# Patient Record
Sex: FEMALE | Race: OTHER | NOT HISPANIC OR LATINO | URBAN - METROPOLITAN AREA
[De-identification: names, ages, dates, MRNs, and addresses within clinical notes are randomized per-mention and may not be internally consistent; named-entity substitution may affect disease eponyms.]

---

## 2022-01-03 ENCOUNTER — EMERGENCY (EMERGENCY)
Facility: HOSPITAL | Age: 27
LOS: 1 days | Discharge: HOME | End: 2022-01-03
Attending: EMERGENCY MEDICINE | Admitting: EMERGENCY MEDICINE
Payer: MEDICAID

## 2022-01-03 VITALS
HEART RATE: 60 BPM | HEIGHT: 67 IN | SYSTOLIC BLOOD PRESSURE: 110 MMHG | TEMPERATURE: 98 F | DIASTOLIC BLOOD PRESSURE: 68 MMHG | RESPIRATION RATE: 18 BRPM | WEIGHT: 108.91 LBS | OXYGEN SATURATION: 98 %

## 2022-01-03 DIAGNOSIS — Z88.0 ALLERGY STATUS TO PENICILLIN: ICD-10-CM

## 2022-01-03 DIAGNOSIS — J02.9 ACUTE PHARYNGITIS, UNSPECIFIED: ICD-10-CM

## 2022-01-03 DIAGNOSIS — U07.1 COVID-19: ICD-10-CM

## 2022-01-03 LAB
HCG UR QL: NEGATIVE — SIGNIFICANT CHANGE UP
S PYO AG SPEC QL IA: NEGATIVE — SIGNIFICANT CHANGE UP
SARS-COV-2 RNA SPEC QL NAA+PROBE: DETECTED

## 2022-01-03 PROCEDURE — 99284 EMERGENCY DEPT VISIT MOD MDM: CPT

## 2022-01-03 NOTE — ED ADULT NURSE NOTE - NSIMPLEMENTINTERV_GEN_ALL_ED
Implemented All Universal Safety Interventions:  [place] to call system. Call bell, personal items and telephone within reach. Instruct patient to call for assistance. Room bathroom lighting operational. Non-slip footwear when patient is off stretcher. Physically safe environment: no spills, clutter or unnecessary equipment. Stretcher in lowest position, wheels locked, appropriate side rails in place.

## 2022-01-03 NOTE — ED ADULT TRIAGE NOTE - CHIEF COMPLAINT QUOTE
Pt complaining of sore throat. PT states "I think I have strep I can see white patches on my tonsils."

## 2022-01-03 NOTE — ED PROVIDER NOTE - ENMT, MLM
Airway patent, NCAT. Mild erythema to bilateral tonsils with enlargement. +White patches on bilateral tonsils. No lymphadenopathy.

## 2022-01-03 NOTE — ED PROVIDER NOTE - CLINICAL SUMMARY MEDICAL DECISION MAKING FREE TEXT BOX
Patient presenting with sore throat x 2 days. Will obtain rapid strep and COVID swab. Patient did not want to wait for results but will f/u to obtain results of test. Stable upon discharge.

## 2022-01-03 NOTE — ED PROVIDER NOTE - OBJECTIVE STATEMENT
25 y/o female presenting with 2 days of sore throat with white patches on her tonsils. Denies fever or chills. Unknown exposure to COVID. Has a history of strep in the past and is allergic to Amoxicillin and Penicillin.

## 2022-03-17 ENCOUNTER — EMERGENCY (EMERGENCY)
Facility: HOSPITAL | Age: 27
LOS: 1 days | Discharge: HOME | End: 2022-03-17
Admitting: EMERGENCY MEDICINE
Payer: MEDICAID

## 2022-03-17 VITALS
SYSTOLIC BLOOD PRESSURE: 126 MMHG | RESPIRATION RATE: 18 BRPM | TEMPERATURE: 98 F | HEART RATE: 74 BPM | HEIGHT: 67 IN | OXYGEN SATURATION: 98 % | DIASTOLIC BLOOD PRESSURE: 89 MMHG

## 2022-03-17 PROBLEM — J02.0 STREPTOCOCCAL PHARYNGITIS: Chronic | Status: ACTIVE | Noted: 2022-01-05

## 2022-03-17 LAB — SARS-COV-2 RNA SPEC QL NAA+PROBE: SIGNIFICANT CHANGE UP

## 2022-03-17 PROCEDURE — 99284 EMERGENCY DEPT VISIT MOD MDM: CPT

## 2022-03-17 NOTE — ED PROVIDER NOTE - PHYSICAL EXAMINATION
Constitutional: Well appearing, awake, alert, oriented to person, place, time/situation and in no apparent distress.  HEENT: Airway patent, NCAT, normal posterior oropharynx without erythema, swelling, or exudates  Resp: no conversational dyspnea, tachypnea, or signs of respiratory distress  Msk: ambulating with normal steady gait  Neuro: A&Ox3

## 2022-03-17 NOTE — ED PROVIDER NOTE - NSFOLLOWUPINSTRUCTIONS_ED_ALL_ED_FT
TRY TAKING ZYRTEC/CETIRIZINE OVER THE COUNTER ALLERGY MEDICATIONS.     IF THOSE DO NOT WORK, YOU CAN TRY SUDAFED OR SUDAFED PE.     YOUR COVID RESULT SHOULD BE TEXTED TO YOU WITHIN 24 HOURS, USUALLY BY TONIGHT.     FOLLOW UP WITH YOUR PMD.       Postnasal Drip    WHAT YOU NEED TO KNOW:    What is postnasal drip? Postnasal drip is a condition that causes a large amount of mucus to collect in your throat or nose. It may also be called upper airway cough syndrome because the mucus causes repeated coughing. You may have a sore throat, or throat tissues may swell. This may feel like a lump in your throat. You may also feel like you need to clear your throat often.    What causes postnasal drip?   •A cold or the flu      •Allergies, such as hay fever or a milk allergy      •Cold air, or dry air in a heated area      •Pregnancy or hormone changes      •Medical conditions such as a deviated septum, gastroesophageal reflux (GERD), or problems with structures in your throat      •Certain medicines, such as birth control pills and blood pressure medicines      •An infection in your sinuses or nose      How is postnasal drip diagnosed and treated? Your healthcare provider will examine you and ask about your symptoms. Tell your provider if you have symptoms all the time or if they come and go. Include anything that triggers your symptoms, such as cold air or pollen. A sample of the mucus may be tested for bacteria that could be causing your symptoms.  •Medicines may be given to thin the mucus. You may need to swallow the medicine or use a device to flush your sinuses with liquid squirted into your nose. Nasal sprays may also be needed to keep the tissues in your nose moist. Medicines can also relieve congestion. Allergy medicine may help if your symptoms are caused by seasonal allergies, such as hay fever. You may need medicine to help control GERD.      •Antibiotics may be needed to treat a bacterial infection.      What can I do to manage postnasal drip?   •Use a humidifier or vaporizer. Use a cool mist humidifier or a vaporizer to increase air moisture in your home. This may make it easier for you to breathe.       •Drink more liquids as directed. Liquids help keep your air passages moist and help you cough up mucus. Ask how much liquid to drink each day and which liquids are best for you.       •Avoid cold air and dry, heated air. Cold or dry air can trigger postnasal drip. Try to stay inside on cold days, or keep your mouth covered. Do not stay long in areas that have dry, heated air.      •Do not smoke, and avoid secondhand smoke. Nicotine and other chemicals in cigarettes and cigars can irritate your throat and make coughing worse. Ask your healthcare provider for information if you currently smoke and need help to quit. E-cigarettes or smokeless tobacco still contain nicotine. Talk to your healthcare provider before you use these products.       When should I contact my healthcare provider?   •You have trouble breathing because of the mucus.      •You have new or worsening symptoms, even with treatment.      •You have signs of an infection, such as yellow or green mucus, or a fever.      •You have questions or concerns about your condition or care.      CARE AGREEMENT:    You have the right to help plan your care. Learn about your health condition and how it may be treated. Discuss treatment options with your healthcare providers to decide what care you want to receive. You always have the right to refuse treatment.

## 2022-03-17 NOTE — ED PROVIDER NOTE - PATIENT PORTAL LINK FT
You can access the FollowMyHealth Patient Portal offered by [facility] by registering at the following website: [email or web address]. By joining [employer]’s FollowMyHealth portal, you will also be able to view your health information using other applications (apps) compatible with our system.

## 2022-03-17 NOTE — ED PROVIDER NOTE - OBJECTIVE STATEMENT
25yo F presents with c/o postnasal drip for 3-4 days. states it feels like there is a bubble in her throat and it is affecting her sleeping. describes some cough also. denies fever, chills, nausea, vomiting, sore throat, swelling in the throat or lips, ear pain, any other concerns. had negative COVID 19 PCR 2 days ago and is fully vaccinated for COVID. attempted tx with tea, salt water gargles, mucinex without relief.

## 2022-03-21 DIAGNOSIS — R09.81 NASAL CONGESTION: ICD-10-CM

## 2022-03-21 DIAGNOSIS — R09.82 POSTNASAL DRIP: ICD-10-CM

## 2022-03-21 DIAGNOSIS — Z88.0 ALLERGY STATUS TO PENICILLIN: ICD-10-CM

## 2023-08-17 NOTE — ED PROVIDER NOTE - PATIENT PORTAL LINK FT
You can access the FollowMyHealth Patient Portal offered by [facility] by registering at the following website: [email or web address]. By joining [employer]’s FollowMyHealth portal, you will also be able to view your health information using other applications (apps) compatible with our system. Estimated Blood Loss (Cc): minimal

## 2024-11-19 NOTE — ED ADULT TRIAGE NOTE - HEIGHT IN INCHES
Name: EMILY MCCABE    Relationship: Mother    Best Callback Number:     [phone]       HUB PROVIDED THE RELAY MESSAGE FROM THE OFFICE   PATIENT     [phone]       ADDITIONAL INFORMATION: PATIENT WOULD LIKE TO TALK WITH DR ONOFRE'S NURSE.  HE HAS AN APPOINTMENT ON THE SCHEDULE FOR THURSDAY, BUT SHE SAID THAT SHE DID NOT MAKE THAT NOR TALK WITH DR ONOFRE ABOUT THIS.     7

## 2025-02-24 ENCOUNTER — EMERGENCY (EMERGENCY)
Facility: HOSPITAL | Age: 30
LOS: 1 days | Discharge: HOME | End: 2025-02-24
Attending: EMERGENCY MEDICINE | Admitting: STUDENT IN AN ORGANIZED HEALTH CARE EDUCATION/TRAINING PROGRAM
Payer: COMMERCIAL

## 2025-02-24 ENCOUNTER — EMERGENCY (EMERGENCY)
Facility: HOSPITAL | Age: 30
LOS: 1 days | Discharge: ANOTHER HOSPITAL | End: 2025-02-24
Attending: EMERGENCY MEDICINE | Admitting: EMERGENCY MEDICINE
Payer: COMMERCIAL

## 2025-02-24 VITALS
DIASTOLIC BLOOD PRESSURE: 60 MMHG | SYSTOLIC BLOOD PRESSURE: 98 MMHG | TEMPERATURE: 98 F | HEART RATE: 71 BPM | RESPIRATION RATE: 16 BRPM | OXYGEN SATURATION: 96 %

## 2025-02-24 VITALS
RESPIRATION RATE: 18 BRPM | TEMPERATURE: 98 F | WEIGHT: 119.93 LBS | OXYGEN SATURATION: 100 % | HEIGHT: 65 IN | DIASTOLIC BLOOD PRESSURE: 64 MMHG | SYSTOLIC BLOOD PRESSURE: 100 MMHG | HEART RATE: 63 BPM

## 2025-02-24 VITALS
RESPIRATION RATE: 18 BRPM | OXYGEN SATURATION: 94 % | HEIGHT: 65 IN | WEIGHT: 119.93 LBS | HEART RATE: 60 BPM | DIASTOLIC BLOOD PRESSURE: 57 MMHG | SYSTOLIC BLOOD PRESSURE: 106 MMHG | TEMPERATURE: 98 F

## 2025-02-24 LAB
ALBUMIN SERPL ELPH-MCNC: 4 G/DL — SIGNIFICANT CHANGE UP (ref 3.4–5)
ALP SERPL-CCNC: 40 U/L — SIGNIFICANT CHANGE UP (ref 40–120)
ALT FLD-CCNC: 17 U/L — SIGNIFICANT CHANGE UP (ref 12–42)
ANION GAP SERPL CALC-SCNC: 9 MMOL/L — SIGNIFICANT CHANGE UP (ref 9–16)
APPEARANCE UR: CLEAR — SIGNIFICANT CHANGE UP
AST SERPL-CCNC: 21 U/L — SIGNIFICANT CHANGE UP (ref 15–37)
BASOPHILS # BLD AUTO: 0.04 K/UL — SIGNIFICANT CHANGE UP (ref 0–0.2)
BASOPHILS NFR BLD AUTO: 0.6 % — SIGNIFICANT CHANGE UP (ref 0–2)
BILIRUB DIRECT SERPL-MCNC: 0.1 MG/DL — SIGNIFICANT CHANGE UP (ref 0–0.3)
BILIRUB INDIRECT FLD-MCNC: 0.1 MG/DL — LOW (ref 0.2–1)
BILIRUB SERPL-MCNC: 0.2 MG/DL — SIGNIFICANT CHANGE UP (ref 0.2–1.2)
BILIRUB UR-MCNC: NEGATIVE — SIGNIFICANT CHANGE UP
BUN SERPL-MCNC: 16 MG/DL — SIGNIFICANT CHANGE UP (ref 7–23)
CALCIUM SERPL-MCNC: 8.8 MG/DL — SIGNIFICANT CHANGE UP (ref 8.5–10.5)
CHLORIDE SERPL-SCNC: 106 MMOL/L — SIGNIFICANT CHANGE UP (ref 96–108)
CO2 SERPL-SCNC: 25 MMOL/L — SIGNIFICANT CHANGE UP (ref 22–31)
COLOR SPEC: YELLOW — SIGNIFICANT CHANGE UP
CREAT SERPL-MCNC: 0.71 MG/DL — SIGNIFICANT CHANGE UP (ref 0.5–1.3)
DIFF PNL FLD: NEGATIVE — SIGNIFICANT CHANGE UP
EGFR: 117 ML/MIN/1.73M2 — SIGNIFICANT CHANGE UP
EOSINOPHIL # BLD AUTO: 0.05 K/UL — SIGNIFICANT CHANGE UP (ref 0–0.5)
EOSINOPHIL NFR BLD AUTO: 0.7 % — SIGNIFICANT CHANGE UP (ref 0–6)
GLUCOSE SERPL-MCNC: 169 MG/DL — HIGH (ref 70–99)
GLUCOSE UR QL: NEGATIVE MG/DL — SIGNIFICANT CHANGE UP
HCG SERPL-ACNC: 1 MIU/ML — SIGNIFICANT CHANGE UP
HCG UR QL: NEGATIVE — SIGNIFICANT CHANGE UP
HCT VFR BLD CALC: 38.6 % — SIGNIFICANT CHANGE UP (ref 34.5–45)
HGB BLD-MCNC: 12.8 G/DL — SIGNIFICANT CHANGE UP (ref 11.5–15.5)
IMM GRANULOCYTES # BLD AUTO: 0.02 K/UL — SIGNIFICANT CHANGE UP (ref 0–0.07)
IMM GRANULOCYTES NFR BLD AUTO: 0.3 % — SIGNIFICANT CHANGE UP (ref 0–0.9)
KETONES UR-MCNC: NEGATIVE MG/DL — SIGNIFICANT CHANGE UP
LEUKOCYTE ESTERASE UR-ACNC: NEGATIVE — SIGNIFICANT CHANGE UP
LIDOCAIN IGE QN: 30 U/L — SIGNIFICANT CHANGE UP (ref 16–77)
LYMPHOCYTES # BLD AUTO: 3.1 K/UL — SIGNIFICANT CHANGE UP (ref 1–3.3)
LYMPHOCYTES NFR BLD AUTO: 44.9 % — HIGH (ref 13–44)
MCHC RBC-ENTMCNC: 29.8 PG — SIGNIFICANT CHANGE UP (ref 27–34)
MCHC RBC-ENTMCNC: 33.2 G/DL — SIGNIFICANT CHANGE UP (ref 32–36)
MCV RBC AUTO: 89.8 FL — SIGNIFICANT CHANGE UP (ref 80–100)
MONOCYTES # BLD AUTO: 0.55 K/UL — SIGNIFICANT CHANGE UP (ref 0–0.9)
MONOCYTES NFR BLD AUTO: 8 % — SIGNIFICANT CHANGE UP (ref 2–14)
NEUTROPHILS # BLD AUTO: 3.15 K/UL — SIGNIFICANT CHANGE UP (ref 1.8–7.4)
NEUTROPHILS NFR BLD AUTO: 45.5 % — SIGNIFICANT CHANGE UP (ref 43–77)
NITRITE UR-MCNC: NEGATIVE — SIGNIFICANT CHANGE UP
NRBC # BLD AUTO: 0 K/UL — SIGNIFICANT CHANGE UP (ref 0–0)
NRBC # FLD: 0 K/UL — SIGNIFICANT CHANGE UP (ref 0–0)
NRBC BLD AUTO-RTO: 0 /100 WBCS — SIGNIFICANT CHANGE UP (ref 0–0)
PH UR: 5.5 — SIGNIFICANT CHANGE UP (ref 5–8)
PLATELET # BLD AUTO: 212 K/UL — SIGNIFICANT CHANGE UP (ref 150–400)
PMV BLD: 11.7 FL — SIGNIFICANT CHANGE UP (ref 7–13)
POTASSIUM SERPL-MCNC: 3.8 MMOL/L — SIGNIFICANT CHANGE UP (ref 3.5–5.3)
POTASSIUM SERPL-SCNC: 3.8 MMOL/L — SIGNIFICANT CHANGE UP (ref 3.5–5.3)
PROT SERPL-MCNC: 7.2 G/DL — SIGNIFICANT CHANGE UP (ref 6.4–8.2)
PROT UR-MCNC: NEGATIVE MG/DL — SIGNIFICANT CHANGE UP
RBC # BLD: 4.3 M/UL — SIGNIFICANT CHANGE UP (ref 3.8–5.2)
RBC # FLD: 12.6 % — SIGNIFICANT CHANGE UP (ref 10.3–14.5)
SODIUM SERPL-SCNC: 140 MMOL/L — SIGNIFICANT CHANGE UP (ref 132–145)
SP GR SPEC: 1.03 — SIGNIFICANT CHANGE UP (ref 1–1.03)
UROBILINOGEN FLD QL: 0.2 MG/DL — SIGNIFICANT CHANGE UP (ref 0.2–1)
WBC # BLD: 6.91 K/UL — SIGNIFICANT CHANGE UP (ref 3.8–10.5)
WBC # FLD AUTO: 6.91 K/UL — SIGNIFICANT CHANGE UP (ref 3.8–10.5)

## 2025-02-24 PROCEDURE — 99285 EMERGENCY DEPT VISIT HI MDM: CPT

## 2025-02-24 PROCEDURE — 99291 CRITICAL CARE FIRST HOUR: CPT

## 2025-02-24 RX ORDER — KETOROLAC TROMETHAMINE 10 MG
15 TABLET ORAL ONCE
Refills: 0 | Status: DISCONTINUED | OUTPATIENT
Start: 2025-02-24 | End: 2025-02-24

## 2025-02-24 RX ORDER — ONDANSETRON 4 MG/1
4 TABLET, ORALLY DISINTEGRATING ORAL ONCE
Refills: 0 | Status: COMPLETED | OUTPATIENT
Start: 2025-02-24 | End: 2025-02-24

## 2025-02-24 RX ORDER — IOHEXOL 350 MG/ML
30 INJECTION, SOLUTION INTRAVENOUS ONCE
Refills: 0 | Status: COMPLETED | OUTPATIENT
Start: 2025-02-24 | End: 2025-02-24

## 2025-02-24 RX ORDER — BACTERIOSTATIC SODIUM CHLORIDE 0.9 %
1000 VIAL (ML) INJECTION ONCE
Refills: 0 | Status: COMPLETED | OUTPATIENT
Start: 2025-02-24 | End: 2025-02-24

## 2025-02-24 RX ORDER — ACETAMINOPHEN 160 MG/5ML
1000 SUSPENSION ORAL ONCE
Refills: 0 | Status: COMPLETED | OUTPATIENT
Start: 2025-02-24 | End: 2025-02-24

## 2025-02-24 RX ORDER — MORPHINE SULFATE 60 MG/1
4 TABLET, FILM COATED, EXTENDED RELEASE ORAL ONCE
Refills: 0 | Status: DISCONTINUED | OUTPATIENT
Start: 2025-02-24 | End: 2025-02-24

## 2025-02-24 RX ADMIN — Medication 1000 MILLILITER(S): at 22:00

## 2025-02-24 RX ADMIN — ONDANSETRON 4 MILLIGRAM(S): 4 TABLET, ORALLY DISINTEGRATING ORAL at 22:00

## 2025-02-24 RX ADMIN — ONDANSETRON 4 MILLIGRAM(S): 4 TABLET, ORALLY DISINTEGRATING ORAL at 23:46

## 2025-02-24 RX ADMIN — Medication 15 MILLIGRAM(S): at 22:00

## 2025-02-24 RX ADMIN — MORPHINE SULFATE 4 MILLIGRAM(S): 60 TABLET, FILM COATED, EXTENDED RELEASE ORAL at 22:00

## 2025-02-24 RX ADMIN — IOHEXOL 30 MILLILITER(S): 350 INJECTION, SOLUTION INTRAVENOUS at 23:27

## 2025-02-24 RX ADMIN — MORPHINE SULFATE 4 MILLIGRAM(S): 60 TABLET, FILM COATED, EXTENDED RELEASE ORAL at 23:17

## 2025-02-24 NOTE — ED PROVIDER NOTE - CRITICAL CARE ATTENDING CONTRIBUTION TO CARE
I have discussed the case with the resident/mid level provider. I have personally performed a history, physical exam, and my own medical decision making. I have reviewed the note and agree with the findings.    Patient presents with lower abdominal pain/pelvic pain. Abdominal exam without peritoneal signs. No evidence of acute abdomen at this time but is focally tender in the R lower abdomen, no guarding or rigidity. Uncomfortable appearing. Suspicion for PID or TOA Is low. Plan to rule out ovarian torsion And currently there is no ultrasound present, will transfer ER to ER for rule out torsion study and further workup in the ED at [facility] patient is amenable to this transfer. Send stat labs as well as rule out pregnancy.If pregnant, ectopic pregnancy is higher on the differential.  Once stabilized with ultrasound for rule out torsion or ectopic patient can be worked up for acute appendicitis versus colitis versus diverticulitis versus pyelonephritis.    Please see subsequent acceptance notes to the ED patient consented for ER to ER transfer to be tier 1 transfer

## 2025-02-24 NOTE — ED PROVIDER NOTE - OBJECTIVE STATEMENT
28 yo f here with acute onset of R lower pelvic pain radiating to the back with nausea w/o vomiting, proceeded by abd bloating for 3 d. She reports starting 1 hr pta she had severe sharp RLQ and RLQ pelvic pain feels gnawing with nausea but no vomiting no provoking or modifying factors.    I have reviewed available current nursing and previous documentation of past medical, surgical, family, and/or social history.

## 2025-02-24 NOTE — ED PROVIDER NOTE - PHYSICAL EXAMINATION
GENERAL:  Awake, alert and in NAD, non-toxic appearing  ENMT: Airway patent  EYES: conjunctiva clear  CARDIAC: Regular rate, regular rhythm.  Heart sounds S1, S2, no S3, S4. No murmurs, rubs or gallops.  RESPIRATORY: Breath sounds clear and equal in bilateral anterior lung fields, no wheezes/ronchi/crackles/stridor; pt breathing and speaking comfortably with no increased WOB, no accessory mm. use, no intercostal retractions, no nasal flaring  GI: abdomen soft, non-distended, +ttp in RLQ and at McBurney point, mild suprapubic ttp no rebound/guarding, no ruq/luq/epigastric/llq/L suprapubic ttp. negative Rovsing, psoas, and obturator sign  gu (chaperone tech); no lesions/bleeding external genitalia. vaginal vault with scant creamy white discharge, no blood/clots/fb/abscess/cyst/fb/lesions; no cmt or bilat adnexal ttp  SKIN: warm and dry, no rashes  PSYCH: awake, alert, calm and cooperative  EXTREMITIES: no ble edema  NEURO: gcs 15

## 2025-02-24 NOTE — ED PROVIDER NOTE - NSFOLLOWUPINSTRUCTIONS_ED_ALL_ED_FT
You have a 6.5 cm right-sided ovarian cyst should your pain become worse and consistent please seek emergent reevaluation, take ibuprofen 600 mg as needed for pain every 6-8 hours.  Follow-up closely with GYN return for worsening symptoms   please reach out to Annabel Alegria ([facility] clinical referral coordinator) to assist you with your follow-up appointment.     Monday - Friday 11am-7pm  [phone]  [email or web address]  Ovarian Cyst  The female reproductive system, with a close-up of the ovaries and an ovarian cyst.  An ovarian cyst is a fluid-filled sac that forms on an ovary. The ovaries are small organs that produce eggs in females. Many types of cysts can form on the ovaries. Most of these cysts go away on their own and are not cancer. Some cysts need treatment.    What are the causes?  Ovarian cysts may be caused by:  Chemical or hormone changes in your body during your normal monthly period.  Polycystic ovarian syndrome (PCOS). This is a common problem of the hormones.  Endometriosis. The tissue that lines the inside of the uterus grows outside of the uterus. If it grows on your ovaries, it can form cysts.  Pregnancy. Your body makes more hormones than normal to support the pregnancy. This can form cysts.  Infection. Infection in your uterus can spread to your ovaries and can form cysts.  What increases the risk?  You are more likely to get this condition if:  You take medicines to help you get pregnant.  You have family members who have ovarian cysts.  What are the signs or symptoms?  Many ovarian cysts do not cause symptoms. If you have symptoms, they may include:  Pain or pressure around the pelvis. The pelvis is the area between the hip bones.  Pain in the lower belly.  Pain during sex.  Swelling in the lower belly.  Periods that don't come at the same time each month.  Pain during a period.  How is this diagnosed?  These cysts are found during a routine exam of the pelvis. You may have other tests to find out more about the cysts. Tests include ultrasound, CT scan, MRI, and blood tests.    How is this treated?  Many ovarian cysts go away on their own without treatment. When treatment is needed, it may include:  Medicines to help treat pain.  A procedure to drain the cyst.  Surgery to take out the cyst.  Hormones or birth control pills.  Surgery to take out the ovary.  Follow these instructions at home:  Take all medicines only as told by your health care provider.  If told, get Pap tests and regular exams of the pelvis.  Do not smoke, vape, or use nicotine or tobacco.  Return to your normal activities when your provider says that it's safe.  Keep all follow-up visits. Your provider may want to check your cyst for 2–3 months to see if it changes.  If you're in menopause, it's important to have your cyst checked closely because females in this age group have a higher rate of cancer of the ovaries.  Contact a health care provider if:  You have any new symptoms.  Your symptoms get worse.  Get help right away if:  You have really bad pain in the belly or pelvis, and the pain comes on all of a sudden.  You have heavy bleeding that soaks through more than one pad every hour.  This information is not intended to replace advice given to you by your health care provider. Make sure you discuss any questions you have with your health care provider.    Document Revised: 09/19/2024 Document Reviewed: 04/16/2024

## 2025-02-24 NOTE — ED PROVIDER NOTE - PATIENT PORTAL LINK FT
You can access the FollowMyHealth Patient Portal offered by [facility] by registering at the following website: [email or web address]. By joining [employer]’s FollowMyHealth portal, you will also be able to view your health information using other applications (apps) compatible with our system.

## 2025-02-24 NOTE — ED ADULT NURSE NOTE - NSFALLUNIVINTERV_ED_ALL_ED
Bed/Stretcher in lowest position, wheels locked, appropriate side rails in place/Call bell, personal items and telephone in reach/Instruct patient to call for assistance before getting out of bed/chair/stretcher/Non-slip footwear applied when patient is off stretcher/[place] to call system/Physically safe environment - no spills, clutter or unnecessary equipment/Purposeful proactive rounding/Room/bathroom lighting operational, light cord in reach

## 2025-02-24 NOTE — ED ADULT TRIAGE NOTE - INTERNATIONAL TRAVEL
Medication Requested:      Disp Refills Start End     dexmethylphenidate (Focalin XR) 20 MG 24 hr capsule 30 capsule 0 3/6/2024 --    Sig - Route: Take 1 capsule by mouth daily. - Oral      Last Rx dispensed (per PDMP): 3-6-24    Last seen: 1-24-24  Follow up: 2-3 months  No show/cancelled appt: none  Upcoming appt: 4/24/2024     Controlled Med - Routed to Provider due to NO PROTOCOL. Orders have been prepped according to providers note.
No

## 2025-02-24 NOTE — ED PROVIDER NOTE - NS ED ROS FT
positive: abdominal pain, nausea  negative: f/c/cough/cp/sob/vomiting/diarrhea/dysuria/hematuria/leg edema/rash

## 2025-02-24 NOTE — ED PROVIDER NOTE - PROGRESS NOTE DETAILS
pt agrees to transfer and accepting joslyn cowan aware and accepts transfer pt prefers to await hcg before ct scan

## 2025-02-24 NOTE — ED ADULT NURSE NOTE - NSFALLUNIVINTERV_ED_ALL_ED
Bed/Stretcher in lowest position, wheels locked, appropriate side rails in place/Call bell, personal items and telephone in reach/Instruct patient to call for assistance before getting out of bed/chair/stretcher/Non-slip footwear applied when patient is off stretcher/[place] to call system/Physically safe environment - no spills, clutter or unnecessary equipment/Purposeful proactive rounding/Room/bathroom lighting operational, light cord in reach

## 2025-02-24 NOTE — ED ADULT NURSE NOTE - CAS DISCH TRANSFER METHOD
[facility] notified of patient's condition and referral made. Spoke with Nancy ALDANA On the phone. [facility] rep on the floor and also updated her.   Call [facility] with any changes or updates.   Ambulance

## 2025-02-24 NOTE — ED ADULT TRIAGE NOTE - GLASGOW COMA SCALE: BEST VERBAL RESPONSE, MLM
Group Note    Date: 01/31/25  Start Time: 8:00 PM   End Time:8:30 PM     Number of Participants: 5    Type of Group: Wrap-Up     Patient's Goal:  Discharge    Notes:  Patient reports that her appetite, and concentration is improving. Reports that she hopes to return home if her  agrees    Status After Intervention:  Improved    Participation Level: Active Listener    Participation Quality: Appropriate    Speech:  normal    Thought Process/Content: Logical    Mood: elevated    Level of consciousness:  Alert    Response to Learning: Able to verbalize current knowledge/experience, Able to verbalize/acknowledge new learning, Able to retain information, and Capable of insight    Modes of Intervention: Education and Support    Discipline Responsible: Registered Nurse     Signature:  Jaycee Guadalupe RN   (V5) oriented

## 2025-02-24 NOTE — ED PROVIDER NOTE - CLINICAL SUMMARY MEDICAL DECISION MAKING FREE TEXT BOX
30 yo f here with acute onset of R lower pelvic pain radiating to the back with nausea w/o vomiting, proceeded by abd bloating for 3 d. She reports starting 1 hr pta she had severe sharp RLQ and RLQ pelvic pain feels gnawing with nausea but no vomiting no provoking or modifying factors.

## 2025-02-24 NOTE — ED PROVIDER NOTE - NSFOLLOWUPCLINICS_GEN_ALL_ED_FT
[facility] - [facility]  OBGYN  [address] [place] [address]  Phone: [phone]  Fax: [phone]

## 2025-02-24 NOTE — ED PROVIDER NOTE - PROGRESS NOTE DETAILS
MD Aye: At this time, at the end of my shift, the patient was signed out to the night team including Dr. Singh. pt pending tvus. morphine initially ordered for pain, pt still in pain, so ofirmev ordered. The patient's disposition, as well as clinical decisions, or clinical interventions that take place after this time reflect the clinical and medical decision making of the night team. MD Aye: At this time, at the end of my shift, the patient was signed out to the night team including Dr. Singh. pt pending tvus. morphine initially ordered for pain, pt still in pain, so ofirmev ordered. The patient's disposition, as well as clinical decisions, or clinical interventions that take place after this time reflect the clinical and medical decision making of the night team.    spoke with us tech who is aware pt is here to r/o torsion and in pain she states pt will be prioritized for us asap MK- Pt w/ large R sided 7cm adnexal cyst w/ intact flow, consulted gyn toradol ordered MK- Patient seen by GYN pain improved to 3 out of 10 after Toradol Dilaudid stated pain was feeling like it was coming back I reassessed patient at the bedside now patient states pain is 100% gone, no longer feels nauseated, abdomen soft nontender nondistended will monitor in ED for recurrence of pain appreciate GYN recommendations will observe MK–Patient is now pain-free CT negative for appendicitis reevaluated by GYN given strict return precautions stable for DC

## 2025-02-24 NOTE — ED PROVIDER NOTE - OBJECTIVE STATEMENT
29F no significant PMH, no PSH transferred to ED from [facility] ED after presenting with R suprapubic pain, transferred to obtain TVUS to r/o torsion. pt reports sudden onset severe R suprapubic pain a few hours ago. deferred pelvic exam at [facility] because she wanted to expedite transfer. reports nausea, denies f/c/vomiting/diarrhea/dysuria. no hx of similar sx.

## 2025-02-24 NOTE — ED ADULT TRIAGE NOTE - CHIEF COMPLAINT QUOTE
pt c/o of lower right abd pain x 3 day increasing pain x 1 day. endorses nausea, denies any changes in bowel or urine.

## 2025-02-24 NOTE — ED ADULT NURSE NOTE - OBJECTIVE STATEMENT
30 y/o female walk in to ED for RUQ abdominal pain that shoots down x 3days but significantly increased in pain w/ nausea no vomiting today. patient denies pmh or surgeries; alert verbal oriented x3 able to make needs known.

## 2025-02-24 NOTE — ED PROVIDER NOTE - PHYSICAL EXAMINATION
Physical Exam    Vital Signs: I have reviewed the initial vital signs.  Constitutional: well-appearing, appears stated age  Eyes: PERRLA, EOM intact, RAPD absent, and symmetrical lids.  ENT: Neck supple with no adenopathy, moist MM.  Cardiovascular: regular rate, regular rhythm, well-perfused extremities  Respiratory: unlabored respiratory effort, clear to auscultation bilaterally  Gastrointestinal: soft, +RLQ and RL pelvic ttp, no guarding, no rebound  GYN: differed per pt request   Musculoskeletal: supple neck, no lower extremity edema  Integumentary: warm, dry, no rash  Neurologic: awake, alert, cranial nerves II-XII grossly intact, extremities’ motor and sensory functions grossly intact  Psychiatric: A&Ox3, appropriate mood, appropriate affect

## 2025-02-24 NOTE — ED ADULT NURSE NOTE - OBJECTIVE STATEMENT
pt was transferred from [facility] for pelvic pain and to r/o ovarian torsion. pt still c/o pelvic pain upon arrival to ED. no vaginal bleeding. lungs clear bilaterally upon auscultation. no use of accessory muscles noted. Denies any chest pain or discomfort at this time.

## 2025-02-24 NOTE — ED PROVIDER NOTE - CLINICAL SUMMARY MEDICAL DECISION MAKING FREE TEXT BOX
29F no significant PMH, no PSH transferred to ED from [facility] ED after presenting with R suprapubic pain, transferred to obtain TVUS to r/o torsion. On exam, spo2 94% on RA, VS otherwise wnl, +RLQ ttp, scant white discharge on pelvic exam.     ddx: appendicitis vs torsion vs ovarian cyst    Plan:  - labs from [facility] show normal cbc, normal cmp, normal lipase, hcg neg, ua wnl  - will order morphine, zofran, ivf in ED  - tvus ordered. oral contrast ordered given priority at this time to r/o torsion so CT will be deferred for us.   - will sign out to night team

## 2025-02-25 VITALS
DIASTOLIC BLOOD PRESSURE: 66 MMHG | TEMPERATURE: 98 F | RESPIRATION RATE: 17 BRPM | HEART RATE: 67 BPM | SYSTOLIC BLOOD PRESSURE: 106 MMHG | OXYGEN SATURATION: 99 %

## 2025-02-25 PROCEDURE — 76830 TRANSVAGINAL US NON-OB: CPT

## 2025-02-25 PROCEDURE — 87491 CHLMYD TRACH DNA AMP PROBE: CPT

## 2025-02-25 PROCEDURE — 76830 TRANSVAGINAL US NON-OB: CPT | Mod: 26

## 2025-02-25 PROCEDURE — 74177 CT ABD & PELVIS W/CONTRAST: CPT | Mod: 26

## 2025-02-25 PROCEDURE — 76856 US EXAM PELVIC COMPLETE: CPT

## 2025-02-25 PROCEDURE — 96374 THER/PROPH/DIAG INJ IV PUSH: CPT

## 2025-02-25 PROCEDURE — 96375 TX/PRO/DX INJ NEW DRUG ADDON: CPT

## 2025-02-25 PROCEDURE — 99284 EMERGENCY DEPT VISIT MOD MDM: CPT | Mod: 25

## 2025-02-25 PROCEDURE — 76856 US EXAM PELVIC COMPLETE: CPT | Mod: 26

## 2025-02-25 PROCEDURE — 74177 CT ABD & PELVIS W/CONTRAST: CPT | Mod: MC

## 2025-02-25 PROCEDURE — 96376 TX/PRO/DX INJ SAME DRUG ADON: CPT

## 2025-02-25 PROCEDURE — 87591 N.GONORRHOEAE DNA AMP PROB: CPT

## 2025-02-25 RX ORDER — HYDROMORPHONE HYDROCHLORIDE 4 MG/ML
0.5 INJECTION, SOLUTION INTRAMUSCULAR; INTRAVENOUS; SUBCUTANEOUS ONCE
Refills: 0 | Status: DISCONTINUED | OUTPATIENT
Start: 2025-02-25 | End: 2025-02-25

## 2025-02-25 RX ORDER — KETOROLAC TROMETHAMINE 10 MG
15 TABLET ORAL ONCE
Refills: 0 | Status: DISCONTINUED | OUTPATIENT
Start: 2025-02-25 | End: 2025-02-25

## 2025-02-25 RX ADMIN — ACETAMINOPHEN 1000 MILLIGRAM(S): 160 SUSPENSION ORAL at 01:25

## 2025-02-25 RX ADMIN — ACETAMINOPHEN 400 MILLIGRAM(S): 160 SUSPENSION ORAL at 00:13

## 2025-02-25 RX ADMIN — Medication 15 MILLIGRAM(S): at 01:28

## 2025-02-25 RX ADMIN — HYDROMORPHONE HYDROCHLORIDE 0.5 MILLIGRAM(S): 4 INJECTION, SOLUTION INTRAMUSCULAR; INTRAVENOUS; SUBCUTANEOUS at 04:01

## 2025-02-25 RX ADMIN — HYDROMORPHONE HYDROCHLORIDE 0.5 MILLIGRAM(S): 4 INJECTION, SOLUTION INTRAMUSCULAR; INTRAVENOUS; SUBCUTANEOUS at 03:27

## 2025-02-25 RX ADMIN — HYDROMORPHONE HYDROCHLORIDE 0.5 MILLIGRAM(S): 4 INJECTION, SOLUTION INTRAMUSCULAR; INTRAVENOUS; SUBCUTANEOUS at 01:28

## 2025-02-25 RX ADMIN — Medication 15 MILLIGRAM(S): at 03:27

## 2025-02-25 RX ADMIN — MORPHINE SULFATE 4 MILLIGRAM(S): 60 TABLET, FILM COATED, EXTENDED RELEASE ORAL at 01:25

## 2025-02-25 NOTE — CONSULT NOTE ADULT - ASSESSMENT
30 yo  w/o LMP 25 presents for RLQ pain for <1 day. In ED, patient is afebrile, hemodynamically stable, normocardic, and normotensive. Labs are without leukocytosis, anemia, elevated LFTs, or lipase. UA is unremarkable. Imaging suggestive of right ovarian simple-appearing cyst with single septation 6.5cm, with bilateral ovarian ateriovenous waveforms. Exam only significant for point tenderness in suprapubic area.    - Differential diagnosis for acute pelvic pain in reproductive age females is broad including ovarian torsion, ectopic pregnancy, ruptured ovarian cyst, tubo-ovarian abscess, appendicitis, UTI, bladder spasm, among others  - Impression is less likely ectopic pregnancy or tubo-ovarian abscess given negative BHCG and absence of leukocytosis, fever, or other pelvic inflammatory signs  - Patient has a 6.5cm adnexal cyst appearing to arise from a para-ovarian or paratubal site; ovarian torsion is a possibility though not likely at this time given overall benign exam/vitals and preserved AV flow to bilateral ovaries; pain may be representative of rupturing or ruptured ovarian cyst  - This patient would benefit from outpatient GYN follow up for repeat imaging or surgical consultation for possible cystectomy    - No acute GYN interventions at this time  - Pain control per ED; consider combination tylenol, toradol, and oxycodone  - Please evaluated for other causes of pain including appendicitis or bladder spasm  - Patient may follow up with [facility] at [address] ([phone]) or [address] ([phone])  - GYN signing off at this time; thank you for this consult    GL PGY2  Discussed with PGY4 Dr. Soto and attending Dr. Mercado 28 yo  w/ LMP 25 presents for RLQ pain for <1 day. In ED, patient is afebrile, hemodynamically stable, normocardic, and normotensive. Labs are without leukocytosis, anemia, elevated LFTs, or lipase. UA is unremarkable. Imaging suggestive of right ovarian simple-appearing cyst with single septation 6.5cm, with bilateral ovarian arteriovenous waveforms. Exam only significant for point tenderness in suprapubic area.    - Differential diagnosis for acute pelvic pain in reproductive age females is broad including ovarian torsion, ectopic pregnancy, ruptured ovarian cyst, tubo-ovarian abscess, appendicitis, UTI, bladder spasm, among others  - Impression is less likely ectopic pregnancy or tubo-ovarian abscess given negative BHCG and absence of leukocytosis, fever, or other pelvic inflammatory signs  - Patient has a 6.5cm adnexal cyst appearing to arise from a para-ovarian or paratubal site; ovarian torsion is a possibility though not likely at this time given overall benign exam/vitals and preserved AV flow to bilateral ovaries; pain may be representative of rupturing or ruptured ovarian cyst  - This patient would benefit from outpatient GYN follow up for repeat imaging or surgical consultation for possible cystectomy    - No acute GYN interventions at this time  - Pain control per ED; consider combination tylenol, toradol, and oxycodone  - Please evaluated for other causes of pain including appendicitis or bladder spasm  - Patient may follow up with [facility] at [address] ([phone]) or [address] ([phone])  - GYN signing off at this time; thank you for this consult    GL PGY2  Discussed with PGY4 Dr. Soto and attending Dr. Mercado

## 2025-02-25 NOTE — CONSULT NOTE ADULT - SUBJECTIVE AND OBJECTIVE BOX
ALISA SWANSON     [id number]  28 yo  w/o LMP 25 presents for RLQ pain for <1 day. Patient presented to [facility] this evening after noting onset of RLQ at approximately 8pm. She was then transferred to [facility] for further workup up RLQ pain. Patient reports RLQ pain, radiating into legs and mid-abdomen, sharp in nature, rated 10/10 when occurring at its worst. She took tylenol at home with minimal relief. This is the first time she has experienced pain such as this. Associated symptoms include nausea and one episode of NBNB emesis. She reports being sexually active with last intercourse yesterday. Patient otherwise denies fever, chills, chest pain, SOB, vaginal bleeding. Last BM today.    OB: ; TOP medication x3  GYN: denies; last pap  wnl; hx abnormal pap unknown result, denies colposcopy  PMH: denies  PSH: denies  MEDS: denies  ALL: PCN (angioedema)  SH: employed; lives in [place]; social alcohol use; denies tobacco, drug use; accompanied by partner      PHYSICAL EXAM:   T(C): 36.7 (25 @ 22:48), Max: 36.7 (25 @ 22:23)  HR: 60 (25 @ 22:48) (60 - 71)  BP: 106/57 (25 @ 22:48) (98/60 - 106/57)  RR: 18 (25 @ 22:48) (16 - 18)  SpO2: 94% (25 @ 22:48) (94% - 100%)    **************************  Constitutional: No acute distress  Cardiovascular: regular rate and rhythm  Respiratory: Clear to ausculation bilaterally; no wheezing, rhonchi, or crackles  Abdomen: soft, tender to palpation in suprapubic area, positive bowel sounds, no rebound or guarding   Extremities: no calf tenderness or swelling bilaterally  SSE: normal external genitalia; vaginal mucosa wnl; cervix wnl; no bleeding, discharge, or lesions  Bimanual: nontender cervix and bilateral adnexa      LABS:                        12.8   6.91  )-----------( 212      ( 2025 21:44 )             38.6       140  |  106  |  16  ----------------------------<  169[H]  3.8   |  25  |  0.71  Ca    8.8      2025 21:44  TPro  7.2  /  Alb  4.0  /  TBili  0.2  /  DBili  0.1  /  AST  21  /  ALT  17  /  AlkPhos  40        Urinalysis Basic - ( 2025 21:44 )  Color: Yellow / Appearance: Clear / S.026 / pH: x  Gluc: 169 mg/dL / Ketone: Negative mg/dL  / Bili: Negative / Urobili: 0.2 mg/dL   Blood: x / Protein: Negative mg/dL / Nitrite: Negative   Leuk Esterase: Negative / RBC: x / WBC x   Sq Epi: x / Non Sq Epi: x / Bacteria: x    HCG Quantitative, Serum: 1 mIU/mL ( @ 22:07)      RADIOLOGY & ADDITIONAL STUDIES:    US Pelvis Complete (25 @ 00:51)  INTERPRETATION:  CLINICAL INFORMATION: Right lower quadrant pain  LMP:2 weeks prior  COMPARISON: None available.  TECHNIQUE:  Endovaginal pelvic sonogram as per order. Transabdominal pelvic sonogram   was also performed as part of our standard protocol. Color and Spectral   Doppler was performed.    FINDINGS:  Uterus: 7.1 cm x 3.2 cm x 5.1 cm.A  2.0 x 1.6 x 1.9 cm subserosal   leiomyoma of the posterior uterine body.  Endometrium: 10 mm. Within normal limits. Trilaminar midcycle appearance.  Right ovary: 4.6 cm x 2.8 cm x 3.6 cm. Within normal limits. Normal   arterial and venous waveforms. Adjacent to the right ovary is a 6.5 x 4.5   x 5.5 cm cyst with single septation. No internal flow.  Left ovary: 3.0 cm x 2.2 cm x 2.5 cm. Within normal limits. Normal   arterial and venous waveforms.  Fluid: Trace.    IMPRESSION:  A septated left adnexal cyst may represent a paraovarian or paratubal   cyst or possibly hydrosalpinx. Due to the size of the cyst a GYN consult   is recommended.  Uterine fibroid.

## 2025-02-26 DIAGNOSIS — R10.2 PELVIC AND PERINEAL PAIN: ICD-10-CM

## 2025-02-26 DIAGNOSIS — R10.13 EPIGASTRIC PAIN: ICD-10-CM

## 2025-02-26 DIAGNOSIS — Z88.0 ALLERGY STATUS TO PENICILLIN: ICD-10-CM

## 2025-02-26 DIAGNOSIS — R14.0 ABDOMINAL DISTENSION (GASEOUS): ICD-10-CM

## 2025-02-26 DIAGNOSIS — R11.0 NAUSEA: ICD-10-CM

## 2025-02-26 LAB
C TRACH RRNA SPEC QL NAA+PROBE: SIGNIFICANT CHANGE UP
N GONORRHOEA RRNA SPEC QL NAA+PROBE: SIGNIFICANT CHANGE UP

## 2025-02-27 DIAGNOSIS — R11.2 NAUSEA WITH VOMITING, UNSPECIFIED: ICD-10-CM

## 2025-02-27 DIAGNOSIS — Z88.0 ALLERGY STATUS TO PENICILLIN: ICD-10-CM

## 2025-02-27 DIAGNOSIS — R10.2 PELVIC AND PERINEAL PAIN: ICD-10-CM

## 2025-02-27 DIAGNOSIS — N83.201 UNSPECIFIED OVARIAN CYST, RIGHT SIDE: ICD-10-CM

## 2025-06-03 NOTE — ED POST DISCHARGE NOTE - NS ED POST DC CALL 1
Name: Nicole Bledsoe      : 1946      MRN: [id number]  Encounter Provider: Wendy Fairbanks MD  Encounter Date: 6/3/2025   Encounter department: [facility]  :  Assessment & Plan  Upper respiratory tract infection, unspecified type  Suspected viral URI.  Symptoms have started to improve since .  Will send an antibiotic to her pharmacy that she can take if her symptoms worsen or do not improve over the next 48 to 72 hours.  Continue with over-the-counter medications.  Mucinex for the cough.  Follow-up if no improvement.  Orders:    azithromycin (Zithromax) 250 mg tablet; Take 2 tablets (500 mg total) by mouth daily for 1 day, THEN 1 tablet (250 mg total) daily for 4 days.           History of Present Illness   Patient presents with:  Sinusitis: Started   Horse getting better   Not wearing hear aid would like ears checked   No fevers has been taken tylenol and cough medication         Sinusitis  Associated symptoms include congestion, headaches, sinus pressure and a sore throat.     Review of Systems   Constitutional:  Positive for fatigue.   HENT:  Positive for congestion, rhinorrhea, sinus pressure and sore throat.    Neurological:  Positive for headaches.       Objective   /82 (BP Location: Left arm, Patient Position: Sitting, Cuff Size: Standard)   Pulse 81   Temp (!) 97 °F (36.1 °C) (Temporal)   Resp 16   Wt 57.2 kg (126 lb)   SpO2 97%   BMI 27.27 kg/m²      Physical Exam  Vitals and nursing note reviewed.   Constitutional:       Appearance: Normal appearance. She is well-developed.   HENT:      Head: Normocephalic and atraumatic.      Nose: Congestion and rhinorrhea present.     Cardiovascular:      Rate and Rhythm: Normal rate and regular rhythm.   Pulmonary:      Effort: Pulmonary effort is normal.      Breath sounds: Normal breath sounds.   Abdominal:      General: Bowel sounds are normal.      Palpations: Abdomen is soft.     Musculoskeletal:       Patient contacted Cervical back: Normal range of motion.     Skin:     General: Skin is warm.     Neurological:      General: No focal deficit present.      Mental Status: She is alert.     Psychiatric:         Mood and Affect: Mood normal.         Speech: Speech normal.

## 2025-06-27 NOTE — ED ADULT NURSE NOTE - NSIMPLEMENTINTERV_GEN_ALL_ED
Never smoker Implemented All Universal Safety Interventions:  Freedom to call system. Call bell, personal items and telephone within reach. Instruct patient to call for assistance. Room bathroom lighting operational. Non-slip footwear when patient is off stretcher. Physically safe environment: no spills, clutter or unnecessary equipment. Stretcher in lowest position, wheels locked, appropriate side rails in place.